# Patient Record
Sex: FEMALE | Race: WHITE
[De-identification: names, ages, dates, MRNs, and addresses within clinical notes are randomized per-mention and may not be internally consistent; named-entity substitution may affect disease eponyms.]

---

## 2021-07-18 ENCOUNTER — HOSPITAL ENCOUNTER (INPATIENT)
Dept: HOSPITAL 53 - M ED | Age: 41
LOS: 3 days | Discharge: HOME | DRG: 755 | End: 2021-07-21
Attending: PSYCHIATRY & NEUROLOGY | Admitting: PSYCHIATRY & NEUROLOGY
Payer: COMMERCIAL

## 2021-07-18 VITALS — WEIGHT: 124.25 LBS | BODY MASS INDEX: 23.46 KG/M2 | HEIGHT: 61 IN

## 2021-07-18 DIAGNOSIS — F10.10: ICD-10-CM

## 2021-07-18 DIAGNOSIS — F17.210: ICD-10-CM

## 2021-07-18 DIAGNOSIS — F43.23: Primary | ICD-10-CM

## 2021-07-18 DIAGNOSIS — R45.851: ICD-10-CM

## 2021-07-18 DIAGNOSIS — M19.90: ICD-10-CM

## 2021-07-18 LAB
ALBUMIN SERPL BCG-MCNC: 3.7 GM/DL (ref 3.2–5.2)
ALT SERPL W P-5'-P-CCNC: 30 U/L (ref 12–78)
AMPHETAMINES UR QL SCN: POSITIVE
APAP SERPL-MCNC: < 2 UG/ML (ref 10–30)
B-HCG SERPL QL: NEGATIVE
BARBITURATES UR QL SCN: NEGATIVE
BENZODIAZ UR QL SCN: NEGATIVE
BILIRUB CONJ SERPL-MCNC: 0.1 MG/DL (ref 0–0.2)
BILIRUB SERPL-MCNC: 0.4 MG/DL (ref 0.2–1)
BUN SERPL-MCNC: 10 MG/DL (ref 7–18)
BZE UR QL SCN: NEGATIVE
CALCIUM SERPL-MCNC: 9.8 MG/DL (ref 8.5–10.1)
CANNABINOIDS UR QL SCN: POSITIVE
CHLORIDE SERPL-SCNC: 108 MEQ/L (ref 98–107)
CO2 SERPL-SCNC: 26 MEQ/L (ref 21–32)
CREAT SERPL-MCNC: 0.7 MG/DL (ref 0.55–1.3)
ETHANOL SERPL-MCNC: 0.13 % (ref 0–0.01)
GFR SERPL CREATININE-BSD FRML MDRD: > 60 ML/MIN/{1.73_M2} (ref 58–?)
GLUCOSE SERPL-MCNC: 74 MG/DL (ref 70–100)
HCT VFR BLD AUTO: 39.3 % (ref 36–47)
HGB BLD-MCNC: 13.2 G/DL (ref 12–15.5)
MCH RBC QN AUTO: 29.4 PG (ref 27–33)
MCHC RBC AUTO-ENTMCNC: 33.6 G/DL (ref 32–36.5)
MCV RBC AUTO: 87.5 FL (ref 80–96)
METHADONE UR QL SCN: NEGATIVE
OPIATES UR QL SCN: NEGATIVE
PCP UR QL SCN: NEGATIVE
PLATELET # BLD AUTO: 243 10^3/UL (ref 150–450)
POTASSIUM SERPL-SCNC: 4 MEQ/L (ref 3.5–5.1)
PROT SERPL-MCNC: 6.7 GM/DL (ref 6.4–8.2)
RBC # BLD AUTO: 4.49 10^6/UL (ref 4–5.4)
SALICYLATES SERPL-MCNC: 5.6 MG/DL (ref 5–30)
SODIUM SERPL-SCNC: 143 MEQ/L (ref 136–145)
TSH SERPL DL<=0.005 MIU/L-ACNC: 0.8 UIU/ML (ref 0.36–3.74)
WBC # BLD AUTO: 13.2 10^3/UL (ref 4–10)

## 2021-07-19 VITALS — DIASTOLIC BLOOD PRESSURE: 71 MMHG | SYSTOLIC BLOOD PRESSURE: 110 MMHG

## 2021-07-19 LAB — RSV RNA NPH QL NAA+PROBE: NEGATIVE

## 2021-07-19 RX ADMIN — ACETAMINOPHEN PRN MG: 325 TABLET ORAL at 23:03

## 2021-07-19 RX ADMIN — OXAZEPAM SCH MG: 15 CAPSULE, GELATIN COATED ORAL at 22:58

## 2021-07-19 RX ADMIN — MULTIPLE VITAMINS W/ MINERALS TAB SCH TAB: TAB at 18:21

## 2021-07-19 RX ADMIN — Medication SCH MG: at 22:58

## 2021-07-19 RX ADMIN — FOLIC ACID SCH MG: 1 TABLET ORAL at 18:21

## 2021-07-20 VITALS — SYSTOLIC BLOOD PRESSURE: 113 MMHG | DIASTOLIC BLOOD PRESSURE: 60 MMHG

## 2021-07-20 VITALS — SYSTOLIC BLOOD PRESSURE: 125 MMHG | DIASTOLIC BLOOD PRESSURE: 59 MMHG

## 2021-07-20 RX ADMIN — FOLIC ACID SCH MG: 1 TABLET ORAL at 08:54

## 2021-07-20 RX ADMIN — OXAZEPAM SCH MG: 15 CAPSULE, GELATIN COATED ORAL at 20:42

## 2021-07-20 RX ADMIN — MULTIPLE VITAMINS W/ MINERALS TAB SCH TAB: TAB at 08:54

## 2021-07-20 RX ADMIN — Medication SCH MG: at 08:54

## 2021-07-20 RX ADMIN — ACETAMINOPHEN PRN MG: 325 TABLET ORAL at 08:56

## 2021-07-20 RX ADMIN — ACETAMINOPHEN PRN MG: 325 TABLET ORAL at 20:43

## 2021-07-20 RX ADMIN — Medication SCH MG: at 20:44

## 2021-07-20 RX ADMIN — OXAZEPAM SCH MG: 15 CAPSULE, GELATIN COATED ORAL at 08:54
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
## 2021-07-21 VITALS — SYSTOLIC BLOOD PRESSURE: 129 MMHG | DIASTOLIC BLOOD PRESSURE: 71 MMHG

## 2021-07-21 RX ADMIN — OXAZEPAM SCH MG: 15 CAPSULE, GELATIN COATED ORAL at 10:10

## 2021-07-21 RX ADMIN — FOLIC ACID SCH MG: 1 TABLET ORAL at 10:12

## 2021-07-21 RX ADMIN — MULTIPLE VITAMINS W/ MINERALS TAB SCH TAB: TAB at 10:11

## 2021-07-21 RX ADMIN — Medication SCH MG: at 09:00

## 2021-07-28 NOTE — MHDSPDOC
[facility] Discharge Summary


Discharge Summary


DATE OF ADMISSION: Jul 19, 2021 at 17:52 


DATE OF DISCHARGE: Jul 21, 2021 at 12:37





DISCHARGE DIAGNOSES:


1..  Adjustment disorder with mixed emotion


2..  Polysubstance abuse





REASON FOR ADMISSION: 41-year-old female admitted after her  called the 

police to report she was suicidal.  Patient appears to have a problem with 

substance abuse issues and apparently had 1 prior admission.  Patient stated 

that she was arguing and fighting with her  but denies making any 

suicidal threats and denies any serious depression and was demanding discharge.





CONSULTANTS INVOLVED:





TREATMENT AND PROGRESS ON THE UNIT : She was seen for daily supportive therapy 

and lethality evaluation.  She maintained good control did not experience any 

acute withdrawal symptoms and is in no acute physical distress.  She is denying 

any serious ongoing depression denies any other mental health issues but is 

superficial but in good control and in no acute distress..





HOSPITAL COURSE: She remained in good control and continued to deny any suicidal

thoughts or plan or intent.  Social work contacted her family and apparently was

given information that she continue to use street drugs and is not working to 

come back home but there is no evidence of any serious violence or suicidal 

behavior.  Patient claims that she has a place to stay with a friend and 

continues to deny any suicidal plan or intent and is not showing any gross 

psychotic symptoms.





DISCHARGE ASSESSMENT: Stable not psychotic and not suicidal





MENTAL STATUS EXAMINATION ON DISCHARGE: 


Patient is a 41-year old female, who is in no acute distress.


Speech is rational relevant.


Language skills are fair.


Thought processes including: Relevant. 


Thought content: Denies any suicidal thoughts.


Abstract reasoning, and computation: Fair.


Description of associations: Relevant and coherent.


Description of abnormal or psychotic thoughts: No psychotic symptoms.


Judgment: Poor.


Insight: Poor.


Orientation to well oriented.


Recent and remote memory: No impairment.


Attention span and concentration: Fair.


Language:.


Fund of knowledge:.


Mood: Moderately anxious.


Affect: Labile but appropriate.





MEDICATIONS ON DISCHARGE:


-For.  No psychotropic medication was given


-For.


-For.





PLAN/FOLLOWUP ARRANGEMENTS: Arranged by discharge planner.  Recommended to have 

a substance abuse counseling





The amount of time spent in the coordination of care for this patient was 

approximately 30 minutes.





ETOH/Disorder Med Rx


ETOH/DRUG DISORDER RX:  N/A





Medications


No Active Prescriptions or Reported Meds





Allergies


Coded Allergies:  


     No Known Allergies (Unverified , 7/18/21)











JOSIE TINOCO M.D.                  Jul 28, 2021 11:35